# Patient Record
Sex: MALE
[De-identification: names, ages, dates, MRNs, and addresses within clinical notes are randomized per-mention and may not be internally consistent; named-entity substitution may affect disease eponyms.]

---

## 2021-06-15 ENCOUNTER — APPOINTMENT (OUTPATIENT)
Dept: ORTHOPEDIC SURGERY | Facility: CLINIC | Age: 46
End: 2021-06-15
Payer: MEDICAID

## 2021-06-15 VITALS — WEIGHT: 180 LBS | HEIGHT: 69 IN | BODY MASS INDEX: 26.66 KG/M2

## 2021-06-15 DIAGNOSIS — G89.29 LUMBAGO WITH SCIATICA, LEFT SIDE: ICD-10-CM

## 2021-06-15 DIAGNOSIS — Z80.9 FAMILY HISTORY OF MALIGNANT NEOPLASM, UNSPECIFIED: ICD-10-CM

## 2021-06-15 DIAGNOSIS — Z87.09 PERSONAL HISTORY OF OTHER DISEASES OF THE RESPIRATORY SYSTEM: ICD-10-CM

## 2021-06-15 DIAGNOSIS — M54.41 LUMBAGO WITH SCIATICA, LEFT SIDE: ICD-10-CM

## 2021-06-15 DIAGNOSIS — Z87.898 PERSONAL HISTORY OF OTHER SPECIFIED CONDITIONS: ICD-10-CM

## 2021-06-15 DIAGNOSIS — M51.36 OTHER INTERVERTEBRAL DISC DEGENERATION, LUMBAR REGION: ICD-10-CM

## 2021-06-15 DIAGNOSIS — M54.42 LUMBAGO WITH SCIATICA, LEFT SIDE: ICD-10-CM

## 2021-06-15 PROBLEM — Z00.00 ENCOUNTER FOR PREVENTIVE HEALTH EXAMINATION: Status: ACTIVE | Noted: 2021-06-15

## 2021-06-15 PROCEDURE — 99204 OFFICE O/P NEW MOD 45 MIN: CPT

## 2021-06-15 RX ORDER — CYCLOBENZAPRINE HYDROCHLORIDE 7.5 MG/1
TABLET, FILM COATED ORAL
Refills: 0 | Status: ACTIVE | COMMUNITY

## 2021-06-15 RX ORDER — TAMSULOSIN HYDROCHLORIDE 0.4 MG/1
CAPSULE ORAL
Refills: 0 | Status: ACTIVE | COMMUNITY

## 2021-06-15 NOTE — DISCUSSION/SUMMARY
[Medication Risks Reviewed] : Medication risks reviewed [de-identified] : He has symptoms of recurrent back pain related to underlying lumbar degenerative disc disease.  Currently he has no pain.  He has been given a prescription for physical therapy for instruction in lower back exercises which she will do on a daily basis.  He is also been given written instructions on how to handle any future episodes with over-the-counter nonsteroidal anti-inflammatory medications in the prescription dose range.

## 2021-06-15 NOTE — PHYSICAL EXAM
[de-identified] : He is fully alert and oriented with a normal mood and affect.  He is in no acute distress as I take the history.  He ambulates with a normal gait including tiptoe and heel walking.  There are no cutaneous abnormalities or palpable bony defects of the spine.  There is no evidence of shortness of breath or respiratory distress.  There is no paravertebral muscle spasm or trochanteric tenderness.  There is mild bilateral sciatic notch sensitivity.  On stance evaluation there is a mild elevation of the left shoulder and a very mild waistline asymmetry with a level pelvis.  With forward flexion of the spine he has small left-sided paravertebral prominences indicative of a mild scoliosis.  His lower extremity neurological examination revealed trace symmetrical reflexes.  Motor power is normal to manual testing in all lower extremity groups and sensation is normal to light touch in all dermatomes.  Straight leg raising is negative to 90° in the sitting position.  His hips and his knees have a full and painless range of motion with normal stability.  Vascular examination shows no evidence of varicosities and there is no lymphedema.  There are no cutaneous abnormalities of the upper or lower extremities.  His upper extremities are normal to inspection and his elbows have a full and painless range of motion with normal stability. [de-identified] : Reviewed outside x-rays of the lumbar spine that revealed some mild disc space narrowing indicative of some mild lumbar degenerative disc disease.  There is a very minimal scoliosis.

## 2021-06-15 NOTE — HISTORY OF PRESENT ILLNESS
[de-identified] : This 45-year-old male started with symptoms of intermittent lower back pain 10 years ago.  He has some constant soreness in his back.  He has had several episodes where the pain was considerably worse with radiation to the buttocks but not down the legs.  Normally the symptoms lasted few days.  He had an increase of the pain 2 weeks ago and it is fully resolved.  He has not had associated neurologic symptoms of numbness, paresthesias or weakness.  The pain is no worse coughing, sneezing or forcing to move his bowels.  He has not had night pain.  The pain can be worse sitting and worse driving for more than 2 hours.  When he has it it is also worse standing and walking.  Treatment when he has it is rest heat and ice.  He is on tamsulosin for prostate issues and has occasional heartburn.  He had a cholecystectomy 5 years ago.

## 2021-06-29 ENCOUNTER — TRANSCRIPTION ENCOUNTER (OUTPATIENT)
Age: 46
End: 2021-06-29